# Patient Record
Sex: FEMALE | Race: BLACK OR AFRICAN AMERICAN | NOT HISPANIC OR LATINO | Employment: FULL TIME | URBAN - NONMETROPOLITAN AREA
[De-identification: names, ages, dates, MRNs, and addresses within clinical notes are randomized per-mention and may not be internally consistent; named-entity substitution may affect disease eponyms.]

---

## 2022-06-07 ENCOUNTER — APPOINTMENT (EMERGENCY)
Dept: ULTRASOUND IMAGING | Facility: HOSPITAL | Age: 27
End: 2022-06-07
Payer: COMMERCIAL

## 2022-06-07 ENCOUNTER — HOSPITAL ENCOUNTER (EMERGENCY)
Facility: HOSPITAL | Age: 27
Discharge: HOME | End: 2022-06-07
Attending: EMERGENCY MEDICINE | Admitting: EMERGENCY MEDICINE
Payer: COMMERCIAL

## 2022-06-07 VITALS
BODY MASS INDEX: 30.36 KG/M2 | DIASTOLIC BLOOD PRESSURE: 71 MMHG | HEIGHT: 62 IN | TEMPERATURE: 99 F | HEART RATE: 85 BPM | WEIGHT: 165 LBS | OXYGEN SATURATION: 98 % | RESPIRATION RATE: 20 BRPM | SYSTOLIC BLOOD PRESSURE: 119 MMHG

## 2022-06-07 DIAGNOSIS — Z34.91 FIRST TRIMESTER PREGNANCY: Primary | ICD-10-CM

## 2022-06-07 DIAGNOSIS — O20.0 THREATENED MISCARRIAGE IN EARLY PREGNANCY: ICD-10-CM

## 2022-06-07 DIAGNOSIS — O23.40 UTI IN PREGNANCY: ICD-10-CM

## 2022-06-07 LAB
ABO GROUP BLD: NORMAL
B-HCG SERPL-ACNC: ABNORMAL MIU/ML
BACTERIA UR QL AUTO: ABNORMAL /HPF
BILIRUB UR QL STRIP: NEGATIVE
CLARITY UR: CLEAR
COLOR UR: YELLOW
GLUCOSE UR STRIP-MCNC: NEGATIVE MG/DL
HGB UR QL STRIP.AUTO: NEGATIVE
KETONES UR STRIP-MCNC: NEGATIVE MG/DL
LEUKOCYTE ESTERASE UR QL STRIP: ABNORMAL
NITRITE UR QL STRIP: NEGATIVE
NON-SQ EPI CELLS URNS QL MICRO: ABNORMAL /HPF
PH UR STRIP.AUTO: 7 [PH]
PROT UR STRIP-MCNC: NEGATIVE MG/DL
RBC #/AREA URNS AUTO: ABNORMAL /HPF
RH BLD: POSITIVE
SP GR UR STRIP.AUTO: 1.01 (ref 1–1.03)
UROBILINOGEN UR QL STRIP.AUTO: 1 E.U./DL
WBC #/AREA URNS AUTO: ABNORMAL /HPF

## 2022-06-07 PROCEDURE — 86900 BLOOD TYPING SEROLOGIC ABO: CPT | Performed by: PHYSICIAN ASSISTANT

## 2022-06-07 PROCEDURE — 76801 OB US < 14 WKS SINGLE FETUS: CPT

## 2022-06-07 PROCEDURE — 87086 URINE CULTURE/COLONY COUNT: CPT | Performed by: PHYSICIAN ASSISTANT

## 2022-06-07 PROCEDURE — 81001 URINALYSIS AUTO W/SCOPE: CPT | Performed by: PHYSICIAN ASSISTANT

## 2022-06-07 PROCEDURE — 99284 EMERGENCY DEPT VISIT MOD MDM: CPT

## 2022-06-07 PROCEDURE — 84702 CHORIONIC GONADOTROPIN TEST: CPT | Performed by: PHYSICIAN ASSISTANT

## 2022-06-07 PROCEDURE — 36415 COLL VENOUS BLD VENIPUNCTURE: CPT | Performed by: PHYSICIAN ASSISTANT

## 2022-06-07 PROCEDURE — 99284 EMERGENCY DEPT VISIT MOD MDM: CPT | Performed by: EMERGENCY MEDICINE

## 2022-06-07 PROCEDURE — 86901 BLOOD TYPING SEROLOGIC RH(D): CPT | Performed by: PHYSICIAN ASSISTANT

## 2022-06-07 RX ORDER — ONDANSETRON 4 MG/1
4 TABLET, ORALLY DISINTEGRATING ORAL ONCE
Status: COMPLETED | OUTPATIENT
Start: 2022-06-07 | End: 2022-06-07

## 2022-06-07 RX ORDER — CEPHALEXIN 500 MG/1
500 CAPSULE ORAL 3 TIMES DAILY
Qty: 21 CAPSULE | Refills: 0 | Status: SHIPPED | OUTPATIENT
Start: 2022-06-07 | End: 2022-06-14

## 2022-06-07 RX ADMIN — ONDANSETRON 4 MG: 4 TABLET, ORALLY DISINTEGRATING ORAL at 19:33

## 2022-06-07 NOTE — ED PROVIDER NOTES
History  Chief Complaint   Patient presents with    Abdominal Pain Pregnant     LMP 22  Pt took an at home pregnacy test yesterday which was positive  She c/o lower abd pain and vaginal cramping       23year old  female presents to ED with chief complaint of positive home pregnancy test yesterday and lower abdominal cramping starting today  Onset of abdominal cramping today  Location: lower abdomen  Quality intermittent cramping  Severity mild  Associated symptoms: denies vaginal bleeding or discharge, denies nausea, vomiting or diarrhea, denies fevers  Positive breast tenderness  Modifiers:  Nothing taken for treatment of symptoms  Context:  LMP 22  Last intercoarse 22  No history STDs  2 prior spontaneous miscarriages  Took home pregnancy test yesterday which was reportedly positive  History provided by:  Patient   used: No        None       History reviewed  No pertinent past medical history  History reviewed  No pertinent surgical history  History reviewed  No pertinent family history  I have reviewed and agree with the history as documented  E-Cigarette/Vaping     E-Cigarette/Vaping Substances     Social History     Tobacco Use    Smoking status: Never Smoker    Smokeless tobacco: Never Used   Substance Use Topics    Alcohol use: Not Currently    Drug use: Not Currently       Review of Systems   Constitutional: Negative for activity change, appetite change, chills, diaphoresis, fatigue, fever and unexpected weight change  HENT: Negative for congestion, dental problem, drooling, ear discharge, ear pain, facial swelling, hearing loss, mouth sores, nosebleeds, postnasal drip, rhinorrhea, sinus pressure, sinus pain, sneezing, sore throat, tinnitus, trouble swallowing and voice change  Eyes: Negative for photophobia, pain, discharge, redness, itching and visual disturbance     Respiratory: Negative for apnea, cough, choking, chest tightness, shortness of breath, wheezing and stridor  Cardiovascular: Negative for chest pain, palpitations and leg swelling  Gastrointestinal: Positive for abdominal pain  Negative for abdominal distention, anal bleeding, blood in stool, constipation, diarrhea, nausea, rectal pain and vomiting  Endocrine: Negative for cold intolerance, heat intolerance, polydipsia, polyphagia and polyuria  Genitourinary: Positive for vaginal pain  Negative for decreased urine volume, difficulty urinating, dyspareunia, dysuria, enuresis, flank pain, frequency, hematuria, menstrual problem, pelvic pain, urgency, vaginal bleeding and vaginal discharge  Musculoskeletal: Negative for arthralgias, back pain, gait problem, joint swelling, myalgias, neck pain and neck stiffness  Skin: Negative for color change, pallor, rash and wound  Allergic/Immunologic: Negative for environmental allergies, food allergies and immunocompromised state  Neurological: Negative for dizziness, tremors, seizures, syncope, facial asymmetry, speech difficulty, weakness, light-headedness, numbness and headaches  Hematological: Negative for adenopathy  Does not bruise/bleed easily  Psychiatric/Behavioral: Negative for agitation, confusion, hallucinations, self-injury and suicidal ideas  The patient is not hyperactive  All other systems reviewed and are negative  Physical Exam  Physical Exam  Vitals and nursing note reviewed  Constitutional:       General: She is not in acute distress  Appearance: Normal appearance  She is well-developed  She is not ill-appearing  Comments: /68 (BP Location: Left arm)   Pulse 75   Temp 99 °F (37 2 °C) (Temporal)   Resp 16   Ht 5' 2" (1 575 m)   Wt 74 8 kg (165 lb)   LMP 04/14/2022 (Exact Date)   SpO2 99%   BMI 30 18 kg/m²      HENT:      Head: Normocephalic and atraumatic        Right Ear: External ear normal       Left Ear: External ear normal       Nose: Nose normal  Mouth/Throat:      Mouth: Mucous membranes are moist    Eyes:      General: No scleral icterus  Right eye: No discharge  Left eye: No discharge  Extraocular Movements: Extraocular movements intact  Conjunctiva/sclera: Conjunctivae normal    Cardiovascular:      Rate and Rhythm: Normal rate  Pulses: Normal pulses  Pulmonary:      Effort: Pulmonary effort is normal  No respiratory distress  Abdominal:      Tenderness: There is abdominal tenderness (suprapubic tenderness to palpation )  There is no right CVA tenderness, left CVA tenderness, guarding or rebound  Hernia: No hernia is present  Musculoskeletal:         General: No swelling, tenderness, deformity or signs of injury  Normal range of motion  Cervical back: Normal range of motion and neck supple  No rigidity or tenderness  No muscular tenderness  Skin:     Capillary Refill: Capillary refill takes less than 2 seconds  Coloration: Skin is not jaundiced or pale  Findings: No erythema or rash  Neurological:      General: No focal deficit present  Mental Status: She is alert and oriented to person, place, and time  Mental status is at baseline  Cranial Nerves: No cranial nerve deficit  Sensory: No sensory deficit  Motor: No weakness  Gait: Gait normal    Psychiatric:         Mood and Affect: Mood normal          Behavior: Behavior normal          Thought Content:  Thought content normal          Judgment: Judgment normal          Vital Signs  ED Triage Vitals [06/07/22 1733]   Temperature Pulse Respirations Blood Pressure SpO2   99 °F (37 2 °C) 75 16 129/68 99 %      Temp Source Heart Rate Source Patient Position - Orthostatic VS BP Location FiO2 (%)   Temporal Monitor Sitting Left arm --      Pain Score       2           Vitals:    06/07/22 1733 06/07/22 1923   BP: 129/68 119/71   Pulse: 75 85   Patient Position - Orthostatic VS: Sitting Lying         Visual Acuity      ED Medications  Medications   ondansetron (ZOFRAN-ODT) dispersible tablet 4 mg (4 mg Oral Given 6/7/22 1933)       Diagnostic Studies  Results Reviewed     Procedure Component Value Units Date/Time    Urine Microscopic [[id number]]  (Abnormal) Collected: 06/07/22 1831    Lab Status: Final result Specimen: Urine, Clean Catch Updated: 06/07/22 1907     RBC, UA None Seen /hpf      WBC, UA 10-20 /hpf      Epithelial Cells Occasional /hpf      Bacteria, UA Occasional /hpf      URINE COMMENT --    UA w Reflex to Microscopic w Reflex to Culture [[id number]]  (Abnormal) Collected: 06/07/22 1831    Lab Status: Final result Specimen: Urine, Clean Catch Updated: 06/07/22 1857     Color, UA Yellow     Clarity, UA Clear     Specific Gravity, UA 1 015     pH, UA 7 0     Leukocytes, UA Moderate     Nitrite, UA Negative     Protein, UA Negative mg/dl      Glucose, UA Negative mg/dl      Ketones, UA Negative mg/dl      Urobilinogen, UA 1 0 E U /dl      Bilirubin, UA Negative     Blood, UA Negative     URINE COMMENT --    Urine culture [[id number]] Collected: 06/07/22 1831    Lab Status:  In process Specimen: Urine, Clean Catch Updated: 06/07/22 1857    hCG, quantitative [[id number]]  (Abnormal) Collected: 06/07/22 1751    Lab Status: Final result Specimen: Blood from Arm, Right Updated: 06/07/22 1844     HCG, Quant 39,816 5 mIU/mL     Narrative:       Expected Ranges:     Approximate               Approximate HCG  Gestation age          Concentration ( mIU/mL)  _____________          ______________________   [facility]                      HCG values  0 2-1                       5-50  1-2                           2-3                         100-5000  3-4                         500-03048  4-5                         1000-54481  5-6                         [id number]  6-8                         43759-502422  8-12                        [id number]                    OB < 14 weeks with transvaginal   Final Result by Mitch Gaspar MD ([id number])      Single live intrauterine gestation at 6 weeks 1 days (range +/- 4)  ADONIS of 2023  Workstation performed: [id number]                    Procedures  Procedures         ED Course                               SBIRT 20yo+    Flowsheet Row Most Recent Value   SBIRT (25 yo +)    In order to provide better care to our patients, we are screening all of our patients for alcohol and drug use  Would it be okay to ask you these screening questions? Unable to answer at this time Filed at: 2022 1830                    MDM  Number of Diagnoses or Management Options  First trimester pregnancy: new and requires workup  Threatened miscarriage in early pregnancy: new and requires workup  UTI in pregnancy: new and requires workup  Diagnosis management comments: DDx including but not limited to: ectopic pregnancy, threatened , missed , incomplete , anemia, coagulopathy, tumor, retained products of conception, polycystic disease  Plan check hcg quant, UA and ultrasound to evaluate for ectopic  Lab results reviewed  UA positive for moderate leukocytes, negative nitrites and negative blood   hcg quant 39, 816 5  Rh o positive - no indication for rhogam        Ultrasound ob results reviewed: A single live intrauterine gestation is identified  Cardiac activity is present   Heart rate of 119 bpm      YOLK SAC:  Present and normal in size and appearance  MEAN GESTATIONAL SAC SIZE: 12 mm = 5 weeks 2 days   MEAN CROWN RUMP LENGTH:  4 mm = 6 weeks 1 days   FETAL ANATOMY:  Appropriate for gestational age  AMNIOTIC FLUID/SAC SHAPE:  Within expected normal range  PLACENTA:  The placenta is appropriate for gestational age  There is no significant subchorionic fluid collection  UTERUS/ADNEXA:   The uterus and ovaries are within normal limits  The cervix remains closed  No free fluid present  ED A/P-  1  1st trimester pregnancy 6 weeks 1 day     2  UTI in pregnancy  3  Abdominal pain in early pregnancy- threatened miscarriage  All results reviewed with patient at bedside  Single IUP with  on US  Quant 39, 816  UA remarkable for moderate leukocytes, 10-20 wbc on micro - c/w UTI in pregnancy - will treat with cephalexin  No vaginal bleeding or discharge  ABO O positive  No indication for rhogam   Discussed given pregnancy status and abdominal pain diagnosis of threatened miscarriage  Tx keflex for uti  Pelvic rest/no heavy lifting instructed  Follow  Up pcp and ob/gyn for prenatal care in 3-5 days  I discussed diagnosis and treatment plan with patient at bedside  Extended discussion with patient regarding the diagnosis, pathophysiology, expectant coarse and treatment plan  Instructed to follow up with pcp and recommended specialist in 3-5 days  Reviewed reasons to return to ed  Patient verbalized understanding of diagnosis and agreement with discharge plan of care as well as understanding of reasons to return to ed             Amount and/or Complexity of Data Reviewed  Clinical lab tests: ordered and reviewed  Tests in the radiology section of CPT®: ordered and reviewed  Discussion of test results with the performing providers: yes  Review and summarize past medical records: yes  Independent visualization of images, tracings, or specimens: yes    Risk of Complications, Morbidity, and/or Mortality  General comments: Disclaimers:    I have reasonably determine that electronically prescribing a controlled substance would be impractical for the patient to obtain the controlled substance prescribed by electronic prescription or would cause an untimely delay resulting in an adverse impact on the patient's medical condition        Patient was seen during the outbreak of the corona virus epidemic   Resources are limited due to the severity of patient illnesses associated with virus   Testing is also limited at this time   Discussed with patient at the time of this evaluation   Due to the fact that limited resources are available -treatment options are limited  Patient Progress  Patient progress: stable      Disposition  Final diagnoses:   First trimester pregnancy   UTI in pregnancy   Threatened miscarriage in early pregnancy     Time reflects when diagnosis was documented in both MDM as applicable and the Disposition within this note     Time User Action Codes Description Comment    6/7/2022  7:20 PM Maranda Foxboro Add [Z34 91] First trimester pregnancy     6/7/2022  7:20 PM Maranda Foxboro Add [O23 40] UTI in pregnancy     6/7/2022  7:21 PM Maranda Foxboro Add [O20 0] Threatened miscarriage in early pregnancy       ED Disposition     ED Disposition   Discharge    Condition   Stable    Date/Time   Tue Jun 7, 2022  7:20 PM    Comment   Fifianna Trevas discharge to home/self care  Follow-up Information     Follow up With Specialties Details Why [place] [address] Emergency Department Emergency Medicine Go to  If symptoms worsen [facility] [phone]  [address] [place] [address] Emergency Department[address], Thien Banuelos, MD Family Medicine Call in 2 days for further evaluation of symptoms [address] [place] [address] [address] ([place] [address])       Sally Friend, [address] Nurse Practitioner, Obstetrics and Gynecology Call in 1 day for further evaluation of symptoms [place] [address]  [address]  [phone]             Discharge Medication List as of 6/7/2022  7:23 PM      START taking these medications    Details   cephalexin (KEFLEX) 500 mg capsule Take 1 capsule (500 mg total) by mouth 3 (three) times a day for 7 days, Starting Tue 6/7/2022, Until Tue 6/14/2022, Print             No discharge procedures on file      PDMP Review     None          ED Provider  Electronically Signed by Ravinder Li PA-C  06/07/22 2012

## 2022-06-09 LAB — BACTERIA UR CULT: NORMAL

## 2022-12-01 ENCOUNTER — HOSPITAL ENCOUNTER (EMERGENCY)
Facility: HOSPITAL | Age: 27
Discharge: HOME | End: 2022-12-01
Attending: EMERGENCY MEDICINE

## 2022-12-01 VITALS
BODY MASS INDEX: 30.12 KG/M2 | WEIGHT: 164.68 LBS | OXYGEN SATURATION: 100 % | SYSTOLIC BLOOD PRESSURE: 106 MMHG | TEMPERATURE: 98.3 F | HEART RATE: 87 BPM | RESPIRATION RATE: 18 BRPM | DIASTOLIC BLOOD PRESSURE: 69 MMHG

## 2022-12-01 DIAGNOSIS — R05.9 COUGH: Primary | ICD-10-CM

## 2022-12-01 DIAGNOSIS — M79.10 MYALGIA: ICD-10-CM

## 2022-12-01 DIAGNOSIS — J06.9 UPPER RESPIRATORY INFECTION: ICD-10-CM

## 2022-12-01 DIAGNOSIS — U07.1 COVID-19: ICD-10-CM

## 2022-12-01 DIAGNOSIS — R09.81 CONGESTION OF NASAL SINUS: ICD-10-CM

## 2022-12-01 LAB
FLUAV RNA RESP QL NAA+PROBE: NEGATIVE
FLUBV RNA RESP QL NAA+PROBE: NEGATIVE
RSV RNA RESP QL NAA+PROBE: NEGATIVE
SARS-COV-2 RNA RESP QL NAA+PROBE: POSITIVE

## 2022-12-02 NOTE — ED PROVIDER NOTES
History  Chief Complaint   Patient presents with   • Cold Like Symptoms     Started having fevers, chills, body aches, and congestion on Tuesday  States she did not take any medications due to being 32 weeks pregnant      50-year-old female, pregnant at approximately 31 weeks, who presents for subjective fevers and chills, body aches, nasal congestion, cough, since Monday  Patient was recently on a flight to [place] for [employer], believes her symptoms may have been due to catching something there, no known sick contacts  Patient denies any nausea vomiting, no diarrhea  No abdominal pains  Her son is here with her with similar symptoms  ROS otherwise negative  None       History reviewed  No pertinent past medical history  History reviewed  No pertinent surgical history  History reviewed  No pertinent family history  I have reviewed and agree with the history as documented  E-Cigarette/Vaping     E-Cigarette/Vaping Substances     Social History     Tobacco Use   • Smoking status: Never   • Smokeless tobacco: Never   Substance Use Topics   • Alcohol use: Not Currently   • Drug use: Not Currently       Review of Systems   Constitutional: Positive for chills and fever  HENT: Positive for congestion  Negative for rhinorrhea and sore throat  Respiratory: Positive for cough  Negative for shortness of breath  Cardiovascular: Negative for chest pain and palpitations  Gastrointestinal: Negative for abdominal pain, constipation, diarrhea, nausea and vomiting  Genitourinary: Negative for difficulty urinating and flank pain  Musculoskeletal: Positive for myalgias  Negative for arthralgias  Neurological: Negative for dizziness, weakness, light-headedness and headaches  Psychiatric/Behavioral: Negative for agitation, behavioral problems and confusion  All other systems reviewed and are negative  Physical Exam  Physical Exam  Constitutional:       Appearance: She is well-developed  Comments: Patient is well-appearing on examination  HENT:      Head: Normocephalic and atraumatic  Right Ear: Tympanic membrane normal       Left Ear: Tympanic membrane normal       Nose: Nose normal       Mouth/Throat:      Mouth: Mucous membranes are moist    Eyes:      Pupils: Pupils are equal, round, and reactive to light  Cardiovascular:      Rate and Rhythm: Normal rate and regular rhythm  Heart sounds: Normal heart sounds  No murmur heard  No friction rub  Pulmonary:      Effort: Pulmonary effort is normal  No respiratory distress  Breath sounds: Normal breath sounds  No wheezing or rales  Abdominal:      General: Bowel sounds are normal  There is no distension  Palpations: Abdomen is soft  Tenderness: There is no abdominal tenderness  Comments: Abdomen consistent with pregnancy of 31 weeks  Fetal heart rate of 152 by Doppler  Musculoskeletal:         General: Normal range of motion  Cervical back: Normal range of motion and neck supple  Skin:     General: Skin is warm  Neurological:      Mental Status: She is alert and oriented to person, place, and time  Coordination: Coordination normal    Psychiatric:         Behavior: Behavior normal          Thought Content:  Thought content normal          Judgment: Judgment normal          Vital Signs  ED Triage Vitals [12/01/22 2031]   Temperature Pulse Respirations Blood Pressure SpO2   98 3 °F (36 8 °C) 87 18 106/69 100 %      Temp src Heart Rate Source Patient Position - Orthostatic VS BP Location FiO2 (%)   -- Monitor Sitting Right arm --      Pain Score       --           Vitals:    12/01/22 2031   BP: 106/69   Pulse: 87   Patient Position - Orthostatic VS: Sitting         Visual Acuity      ED Medications  Medications - No data to display    Diagnostic Studies  Results Reviewed     Procedure Component Value Units Date/Time    FLU/RSV/COVID - if FLU/RSV clinically relevant [[id number]]  (Abnormal) Collected: 12/01/22 2040    Lab Status: Final result Specimen: Nares from Nose Updated: 12/01/22 2128     SARS-CoV-2 Positive     INFLUENZA A PCR Negative     INFLUENZA B PCR Negative     RSV PCR Negative    Narrative:      FOR PEDIATRIC PATIENTS - copy/paste COVID Guidelines URL to browser: [email or web address]    SARS-CoV-2 assay is a Nucleic Acid Amplification assay intended for the  qualitative detection of nucleic acid from SARS-CoV-2 in nasopharyngeal  swabs  Results are for the presumptive identification of SARS-CoV-2 RNA  Positive results are indicative of infection with SARS-CoV-2, the virus  causing COVID-19, but do not rule out bacterial infection or co-infection  with other viruses  Laboratories within the [place] and its  territories are required to report all positive results to the appropriate  public health authorities  Negative results do not preclude SARS-CoV-2  infection and should not be used as the sole basis for treatment or other  patient management decisions  Negative results must be combined with  clinical observations, patient history, and epidemiological information  This test has not been FDA cleared or approved  This test has been authorized by FDA under an Emergency Use Authorization  (EUA)  This test is only authorized for the duration of time the  declaration that circumstances exist justifying the authorization of the  emergency use of an in vitro diagnostic tests for detection of SARS-CoV-2  virus and/or diagnosis of COVID-19 infection under section 564(b)(1) of  the Act, 21 U  S C  615RNA-6(T)(9), unless the authorization is terminated  or revoked sooner  The test has been validated but independent review by FDA  and CLIA is pending  Test performed using [employer] GeneXpert: This RT-PCR assay targets N2,  a region unique to SARS-CoV-2   A conserved region in the E-gene was chosen  for pan-Sarbecovirus detection which includes SARS-CoV-2  According to CMS-2020-01-R, this platform meets the definition of high-throughput technology  No orders to display              Procedures  Procedures         ED Course                                             MDM  Number of Diagnoses or Management Options  Diagnosis management comments: Patient's presentation is consistent with viral syndrome  Will viral swab  Supportive therapy, hydration, Tylenol  Disposition  Final diagnoses:   Cough   Congestion of nasal sinus   Myalgia   Upper respiratory infection   COVID-19     Time reflects when diagnosis was documented in both MDM as applicable and the Disposition within this note     Time User Action Codes Description Comment    12/1/2022  9:21 PM Karole Vilma Add [R05 9] Cough     12/1/2022  9:21 PM Karole Vilma Add [R09 81] Congestion of nasal sinus     12/1/2022  9:21 PM Karole Vilma Add [U96 97] Myalgia     12/1/2022  9:22 PM [address] [place] [address] [J06 9] Upper respiratory infection     12/1/2022  9:45 PM [address] [place] [address] [U07 1] [id number]       ED Disposition     ED Disposition   Discharge    Condition   Stable    Date/Time   Thu Dec 1, 2022  9:45 PM    Comment   Tory Wadsworth discharge to home/self care  Follow-up Information     Follow up With Specialties Details Why Contact Info Additional Information    OB  Call  For re-evaluation, As needed      [facility] Emergency Department Emergency Medicine  If symptoms worsen [phone]  [facility] Emergency Department, [address], [facility]          There are no discharge medications for this patient  No discharge procedures on file      PDMP Review     None          ED Provider  Electronically Signed by           Shahrzad Velazquez MD  12/01/22 2025